# Patient Record
Sex: MALE | Race: BLACK OR AFRICAN AMERICAN | NOT HISPANIC OR LATINO | URBAN - METROPOLITAN AREA
[De-identification: names, ages, dates, MRNs, and addresses within clinical notes are randomized per-mention and may not be internally consistent; named-entity substitution may affect disease eponyms.]

---

## 2017-03-13 ENCOUNTER — IMPORTED ENCOUNTER (OUTPATIENT)
Dept: URBAN - METROPOLITAN AREA CLINIC 1 | Facility: CLINIC | Age: 52
End: 2017-03-13

## 2017-03-13 PROBLEM — H44.23: Noted: 2017-03-13

## 2017-03-13 PROBLEM — H52.4: Noted: 2017-03-13

## 2017-03-13 PROCEDURE — S0621 ROUTINE OPHTHALMOLOGICAL EXA: HCPCS

## 2017-03-13 NOTE — PATIENT DISCUSSION
1. Myopia: Rx was given for correction if indicated and requested. 2. Presbyopia 3. Cataract OU - Observe 4. Dry Eyes w/ PEK OUReturn for an appointment in 1 year for 40 and Contact lens check. with Dr. Mona Alford.

## 2018-03-13 ENCOUNTER — IMPORTED ENCOUNTER (OUTPATIENT)
Dept: URBAN - METROPOLITAN AREA CLINIC 1 | Facility: CLINIC | Age: 53
End: 2018-03-13

## 2018-03-13 PROBLEM — H52.4: Noted: 2018-03-13

## 2018-03-13 PROCEDURE — S0621 ROUTINE OPHTHALMOLOGICAL EXA: HCPCS

## 2018-03-13 NOTE — PATIENT DISCUSSION
1.  Presbyopia: Rx was given for corrective spectacles if indicated. 2.  AYAAN OU-REC patient to cont using AT BID OU 3. Cataracts OU-observe 4. Return for an appointment in 1 year for 40 and Contact lens check. with Dr. Divya Barnett.

## 2019-03-19 ENCOUNTER — IMPORTED ENCOUNTER (OUTPATIENT)
Dept: URBAN - METROPOLITAN AREA CLINIC 1 | Facility: CLINIC | Age: 54
End: 2019-03-19

## 2019-03-19 PROBLEM — H52.4: Noted: 2019-03-19

## 2019-03-19 PROBLEM — H44.23: Noted: 2019-03-19

## 2019-03-19 PROCEDURE — 92014 COMPRE OPH EXAM EST PT 1/>: CPT

## 2019-03-19 PROCEDURE — 92015 DETERMINE REFRACTIVE STATE: CPT

## 2019-03-19 NOTE — PATIENT DISCUSSION
1. Myopia-- Rx was given for correction if indicated and requested. 2. Presbyopia3. AYAAN w/ PEK OU -- Recommend continue ATs BID OU. 4. Cataracts OU -- Observe. Finalized CTL Rx today. Advised that patient is at higher risk of infection due to extended CL Wear. Strongly recommended patient d/c sleeping in CLs and wear CLs for their FDA Approved amount of time. Return for an appointment in 1 year for a 40/ccwith Dr. Nata Radford.

## 2020-09-25 ENCOUNTER — IMPORTED ENCOUNTER (OUTPATIENT)
Dept: URBAN - METROPOLITAN AREA CLINIC 1 | Facility: CLINIC | Age: 55
End: 2020-09-25

## 2020-09-25 PROBLEM — H52.13: Noted: 2020-09-25

## 2020-09-25 PROBLEM — H52.4: Noted: 2020-09-25

## 2020-09-25 PROCEDURE — S0621 ROUTINE OPHTHALMOLOGICAL EXA: HCPCS

## 2020-09-25 NOTE — PATIENT DISCUSSION
1. Myopia-- Rx was given for correction if indicated and requested. 2. Presbyopia3. AYAAN w/ PEK OU -- Recommend continue ATs BID OU. 4. Cataracts OU -- Observe. Finalized CTL Rx today. Advised that patient is at higher risk of infection due to extended CL Wear. Strongly recommended patient d/c sleeping in CLs and wear CLs for their FDA Approved amount of time. Return for an appointment in 1 year 40/CC with Dr. Orlando Leslie.

## 2021-09-29 ENCOUNTER — IMPORTED ENCOUNTER (OUTPATIENT)
Dept: URBAN - METROPOLITAN AREA CLINIC 1 | Facility: CLINIC | Age: 56
End: 2021-09-29

## 2021-09-29 PROBLEM — H52.4: Noted: 2021-09-29

## 2021-09-29 PROBLEM — H44.23: Noted: 2021-09-29

## 2021-09-29 PROCEDURE — S0621 ROUTINE OPHTHALMOLOGICAL EXA: HCPCS

## 2021-09-29 NOTE — PATIENT DISCUSSION
1. Myopia: Rx was given for correction if indicated and requested. 2. Presbyopia 3. AYAAN w/ PEK OU -- Recommend continue ATs BID OU. 4.  NS Cataracts OU -- Observe. New CTL trials given today to improve nva. OK to final if patient calls. Return for an appointment in 1 week for a CC with Dr. Damian Trivedi. Return for an appointment in 1 year for a 40/cc with Dr. Damian Trivedi.

## 2022-04-02 ASSESSMENT — VISUAL ACUITY
OS_CC: J1
OS_CC: J2
OS_CC: J2
OD_SC: 20/20
OD_SC: 20/20-1
OD_SC: 20/20-1
OD_SC: 20/30
OS_CC: J2
OD_SC: 20/20
OS_CC: J3

## 2022-04-02 ASSESSMENT — TONOMETRY
OD_IOP_MMHG: 17
OS_IOP_MMHG: 17
OS_IOP_MMHG: 16
OS_IOP_MMHG: 15
OS_IOP_MMHG: 17
OS_IOP_MMHG: 17
OD_IOP_MMHG: 16
OD_IOP_MMHG: 17
OD_IOP_MMHG: 15
OD_IOP_MMHG: 17

## 2022-05-17 ENCOUNTER — EMERGENCY VISIT (OUTPATIENT)
Dept: URBAN - METROPOLITAN AREA CLINIC 1 | Facility: CLINIC | Age: 57
End: 2022-05-17

## 2022-05-17 DIAGNOSIS — H43.811: ICD-10-CM

## 2022-05-17 PROCEDURE — 92012 INTRM OPH EXAM EST PATIENT: CPT

## 2022-05-17 ASSESSMENT — TONOMETRY
OD_IOP_MMHG: 17
OS_IOP_MMHG: 16

## 2022-05-17 ASSESSMENT — VISUAL ACUITY
OD_CC: 20/25+2
OS_CC: J1

## 2022-06-22 ENCOUNTER — FOLLOW UP (OUTPATIENT)
Dept: URBAN - METROPOLITAN AREA CLINIC 1 | Facility: CLINIC | Age: 57
End: 2022-06-22

## 2022-06-22 DIAGNOSIS — H43.811: ICD-10-CM

## 2022-06-22 PROCEDURE — 92012 INTRM OPH EXAM EST PATIENT: CPT

## 2022-06-22 ASSESSMENT — VISUAL ACUITY
OS_CC: J1
OD_CC: 20/20

## 2022-06-22 ASSESSMENT — TONOMETRY
OS_IOP_MMHG: 17
OD_IOP_MMHG: 17

## 2022-12-22 ENCOUNTER — COMPREHENSIVE EXAM (OUTPATIENT)
Dept: URBAN - METROPOLITAN AREA CLINIC 1 | Facility: CLINIC | Age: 57
End: 2022-12-22

## 2022-12-22 PROCEDURE — 92015 DETERMINE REFRACTIVE STATE: CPT

## 2022-12-22 PROCEDURE — 92014 COMPRE OPH EXAM EST PT 1/>: CPT

## 2022-12-22 PROCEDURE — 92310 CONTACT LENS FITTING OU: CPT

## 2022-12-22 ASSESSMENT — VISUAL ACUITY
OS_CC: J1+
OD_CC: 20/20

## 2022-12-22 ASSESSMENT — TONOMETRY
OD_IOP_MMHG: 15
OS_IOP_MMHG: 15

## 2022-12-22 NOTE — PATIENT DISCUSSION
Patient came in today wearing Acuvue Oasys -9.00 sph OD, -7.00 sph OS and states that his vision and comfort are doing well in them. Finalized this RX today.

## 2023-01-16 ENCOUNTER — CONTACT LENSES/GLASSES VISIT (OUTPATIENT)
Dept: URBAN - METROPOLITAN AREA CLINIC 1 | Facility: CLINIC | Age: 58
End: 2023-01-16

## 2023-01-16 DIAGNOSIS — Z46.0: ICD-10-CM

## 2023-01-16 PROCEDURE — 92015 DETERMINE REFRACTIVE STATE: CPT

## 2023-01-16 PROCEDURE — 92004 COMPRE OPH EXAM NEW PT 1/>: CPT

## 2023-01-16 ASSESSMENT — VISUAL ACUITY
OD_CC: 20/20-2
OS_CC: J1+

## 2023-01-16 NOTE — PATIENT DISCUSSION
Patient is here today for a contact lens follow-up/ check. He states that he likes the -6.50 in the left eye rather than the -7.00. Finalized -9.00 sph OD and -6.50 OS Jori Waldron.

## 2024-03-25 ENCOUNTER — COMPREHENSIVE EXAM (OUTPATIENT)
Dept: URBAN - METROPOLITAN AREA CLINIC 1 | Facility: CLINIC | Age: 59
End: 2024-03-25

## 2024-03-25 DIAGNOSIS — Z46.0: ICD-10-CM

## 2024-03-25 DIAGNOSIS — H52.13: ICD-10-CM

## 2024-03-25 DIAGNOSIS — H52.221: ICD-10-CM

## 2024-03-25 DIAGNOSIS — H52.4: ICD-10-CM

## 2024-03-25 DIAGNOSIS — Z01.00: ICD-10-CM

## 2024-03-25 PROCEDURE — 92014 COMPRE OPH EXAM EST PT 1/>: CPT

## 2024-03-25 PROCEDURE — 92015 DETERMINE REFRACTIVE STATE: CPT

## 2024-03-25 PROCEDURE — 92310-2 LEVEL 2 CONTACT LENS MANAGEMENT

## 2024-03-25 ASSESSMENT — VISUAL ACUITY
OS_CC: 20/40
OS_CC: J3
OD_CC: 20/30

## 2024-03-25 ASSESSMENT — TONOMETRY
OD_IOP_MMHG: 16
OS_IOP_MMHG: 16

## 2024-04-18 ENCOUNTER — CONTACT LENSES/GLASSES VISIT (OUTPATIENT)
Dept: URBAN - METROPOLITAN AREA CLINIC 1 | Facility: CLINIC | Age: 59
End: 2024-04-18

## 2024-04-18 DIAGNOSIS — Z46.0: ICD-10-CM

## 2024-04-18 PROCEDURE — 92310-F CONTACT LENS FITTING FOLLOW UP

## 2024-04-18 ASSESSMENT — VISUAL ACUITY
OD_CC: 20/30-1
OS_CC: 20/40
OU_CC: J1

## 2024-05-02 ENCOUNTER — CONTACT LENSES/GLASSES VISIT (OUTPATIENT)
Dept: URBAN - METROPOLITAN AREA CLINIC 1 | Facility: CLINIC | Age: 59
End: 2024-05-02

## 2024-05-02 DIAGNOSIS — Z46.0: ICD-10-CM

## 2024-05-02 PROCEDURE — 92310-F CONTACT LENS FITTING FOLLOW UP

## 2024-05-02 ASSESSMENT — VISUAL ACUITY
OS_CC: 20/20
OD_CC: 20/40

## 2025-03-25 ENCOUNTER — COMPREHENSIVE EXAM (OUTPATIENT)
Age: 60
End: 2025-03-25

## 2025-03-25 DIAGNOSIS — H52.13: ICD-10-CM

## 2025-03-25 DIAGNOSIS — Z01.00: ICD-10-CM

## 2025-03-25 DIAGNOSIS — H52.221: ICD-10-CM

## 2025-03-25 DIAGNOSIS — H52.4: ICD-10-CM

## 2025-03-25 DIAGNOSIS — Z46.0: ICD-10-CM

## 2025-03-25 PROCEDURE — 92025 CPTRIZED CORNEAL TOPOGRAPHY: CPT | Mod: NC

## 2025-03-25 PROCEDURE — 92014 COMPRE OPH EXAM EST PT 1/>: CPT

## 2025-03-25 PROCEDURE — 92015 DETERMINE REFRACTIVE STATE: CPT

## 2025-03-25 PROCEDURE — 92310-2 LEVEL 2 SOFT LENS UPDATE

## 2025-04-25 ENCOUNTER — FOLLOW UP (OUTPATIENT)
Age: 60
End: 2025-04-25

## 2025-04-25 DIAGNOSIS — H16.143: ICD-10-CM

## 2025-04-25 DIAGNOSIS — H04.123: ICD-10-CM

## 2025-04-25 PROCEDURE — 83861 MICROFLUID ANALY TEARS: CPT

## 2025-04-25 PROCEDURE — 92012 INTRM OPH EXAM EST PATIENT: CPT

## 2025-06-26 ENCOUNTER — FOLLOW UP (OUTPATIENT)
Age: 60
End: 2025-06-26

## 2025-06-26 DIAGNOSIS — H16.143: ICD-10-CM

## 2025-06-26 DIAGNOSIS — H04.123: ICD-10-CM

## 2025-06-26 PROCEDURE — 99213 OFFICE O/P EST LOW 20 MIN: CPT
